# Patient Record
Sex: FEMALE | Race: WHITE | NOT HISPANIC OR LATINO | Employment: UNEMPLOYED | URBAN - METROPOLITAN AREA
[De-identification: names, ages, dates, MRNs, and addresses within clinical notes are randomized per-mention and may not be internally consistent; named-entity substitution may affect disease eponyms.]

---

## 2024-03-26 ENCOUNTER — TELEPHONE (OUTPATIENT)
Dept: INTERNAL MEDICINE | Facility: CLINIC | Age: 19
End: 2024-03-26
Payer: COMMERCIAL

## 2024-03-26 NOTE — TELEPHONE ENCOUNTER
Caller: Jordan Schilling    Relationship: Self    Best call back number: [phone]    What is the best time to reach you: ANYTIME     What was the call regarding: PATIENT STATES HER MOM BETITO LYON(STORY) IS A PATIENT OF [facility] AND WAS TOLD TO ASK FOR TANISHA TO GET HER WORKED IN WITH [facility] AS A NEW PATIENT.     PATIENT STATES SHE IS REQUESTING A CALL BACK.

## 2024-04-16 ENCOUNTER — OFFICE VISIT (OUTPATIENT)
Dept: INTERNAL MEDICINE | Facility: CLINIC | Age: 19
End: 2024-04-16
Payer: COMMERCIAL

## 2024-04-16 VITALS
BODY MASS INDEX: 20.11 KG/M2 | DIASTOLIC BLOOD PRESSURE: 70 MMHG | SYSTOLIC BLOOD PRESSURE: 112 MMHG | HEIGHT: 64 IN | HEART RATE: 71 BPM | WEIGHT: 117.8 LBS | OXYGEN SATURATION: 98 %

## 2024-04-16 DIAGNOSIS — L70.9 ADULT ACNE: ICD-10-CM

## 2024-04-16 DIAGNOSIS — J45.990 EXERCISE-INDUCED ASTHMA: ICD-10-CM

## 2024-04-16 DIAGNOSIS — Z00.00 PE (PHYSICAL EXAM), ANNUAL: Primary | ICD-10-CM

## 2024-04-16 DIAGNOSIS — Z11.59 SCREENING FOR VIRAL DISEASE: ICD-10-CM

## 2024-04-16 PROCEDURE — 99385 PREV VISIT NEW AGE 18-39: CPT | Performed by: NURSE PRACTITIONER

## 2024-04-16 RX ORDER — ALBUTEROL SULFATE 90 UG/1
2 AEROSOL, METERED RESPIRATORY (INHALATION) EVERY 4 HOURS PRN
Qty: 6.7 G | Refills: 0 | Status: SHIPPED | OUTPATIENT
Start: 2024-04-16

## 2024-04-16 RX ORDER — DROSPIRENONE AND ETHINYL ESTRADIOL 0.02-3(28)
KIT ORAL
COMMUNITY
Start: 2024-03-21

## 2024-04-16 RX ORDER — SPIRONOLACTONE 100 MG/1
TABLET, FILM COATED ORAL
COMMUNITY
Start: 2024-03-22

## 2024-04-17 LAB
ALBUMIN SERPL-MCNC: 4.2 G/DL (ref 3.5–5.2)
ALBUMIN/GLOB SERPL: 2.1 G/DL
ALP SERPL-CCNC: 64 U/L (ref 39–117)
ALT SERPL-CCNC: 13 U/L (ref 1–33)
AST SERPL-CCNC: 16 U/L (ref 1–32)
BILIRUB SERPL-MCNC: 0.2 MG/DL (ref 0–1.2)
BUN SERPL-MCNC: 12 MG/DL (ref 6–20)
BUN/CREAT SERPL: 14.5 (ref 7–25)
CALCIUM SERPL-MCNC: 9.3 MG/DL (ref 8.6–10.5)
CHLORIDE SERPL-SCNC: 103 MMOL/L (ref 98–107)
CHOLEST SERPL-MCNC: 148 MG/DL (ref 0–200)
CO2 SERPL-SCNC: 24.7 MMOL/L (ref 22–29)
CREAT SERPL-MCNC: 0.83 MG/DL (ref 0.57–1)
EGFRCR SERPLBLD CKD-EPI 2021: 103.6 ML/MIN/1.73
ERYTHROCYTE [DISTWIDTH] IN BLOOD BY AUTOMATED COUNT: 11.9 % (ref 12.3–15.4)
GLOBULIN SER CALC-MCNC: 2 GM/DL
GLUCOSE SERPL-MCNC: 81 MG/DL (ref 65–99)
HCT VFR BLD AUTO: 39.3 % (ref 34–46.6)
HCV IGG SERPL QL IA: NON REACTIVE
HDLC SERPL-MCNC: 84 MG/DL (ref 40–60)
HGB BLD-MCNC: 12.8 G/DL (ref 12–15.9)
LDLC SERPL CALC-MCNC: 50 MG/DL (ref 0–100)
MCH RBC QN AUTO: 27.6 PG (ref 26.6–33)
MCHC RBC AUTO-ENTMCNC: 32.6 G/DL (ref 31.5–35.7)
MCV RBC AUTO: 84.9 FL (ref 79–97)
PLATELET # BLD AUTO: 303 10*3/MM3 (ref 140–450)
POTASSIUM SERPL-SCNC: 4.4 MMOL/L (ref 3.5–5.2)
PROT SERPL-MCNC: 6.2 G/DL (ref 6–8.5)
RBC # BLD AUTO: 4.63 10*6/MM3 (ref 3.77–5.28)
SODIUM SERPL-SCNC: 137 MMOL/L (ref 136–145)
TRIGL SERPL-MCNC: 74 MG/DL (ref 0–150)
TSH SERPL DL<=0.005 MIU/L-ACNC: 2.09 UIU/ML (ref 0.27–4.2)
VLDLC SERPL CALC-MCNC: 14 MG/DL (ref 5–40)
WBC # BLD AUTO: 5.12 10*3/MM3 (ref 3.4–10.8)

## 2024-04-29 PROBLEM — J45.990 EXERCISE-INDUCED ASTHMA: Status: ACTIVE | Noted: 2024-04-29

## 2024-04-29 PROBLEM — L70.9 ADULT ACNE: Chronic | Status: ACTIVE | Noted: 2024-04-29

## 2024-04-29 PROBLEM — J45.990 EXERCISE-INDUCED ASTHMA: Chronic | Status: ACTIVE | Noted: 2024-04-29

## 2024-04-29 PROBLEM — L70.9 ADULT ACNE: Status: ACTIVE | Noted: 2024-04-29

## 2024-04-29 NOTE — PROGRESS NOTES
Christin Schilling is a 19 y.o. female who is here for a physical exam.    History of Present Illness   History of Present Illness  The patient presents to the office to establish care.     The patient maintains an active lifestyle, participating in track at [employer], and is currently in the season. She engages in weightlifting exercises twice a week and practices from 4:00 PM to 6:30 PM daily.    The patient was recently prescribed spironolactone 100 mg by her dermatologist for acne management. However, due to concerns about potential side effects, she has not yet started the medication. She has been on oral contraceptives since her xiomy year of high school, prescribed by her dermatologist. Her menstrual cycles are regular, and she denies any gastrointestinal symptoms such as diarrhea, constipation, hematochezia, or abdominal pain. She reports feeling emotional the week prior to her menstrual cycle. She has been applying topical clindamycin for the past 3 weeks for mgmt of acne, which has provided some relief.    The patient was diagnosed with exercise-induced asthma previously. She exhausted her inhaler supply a month ago and has not been using it since then. She typically uses her inhaler 30 minutes prior to running.    Supplemental Information  She has seasonal allergies, which normally worsen in the fall.     She gets migraines, with the last one being in 01/2024. She normally gets 2 migraines a year. She takes Excedrin when she gets a migraine, and it takes a little amount of time to work. She normally gets migraines when she is dehydrated.    She denies any neck or back issues. She wears sunscreen in the summer. She denies any knee or hip issues. She denies any joint issues or injuries. She denies any bad sprains with her ankles. She sleeps well at night.      History reviewed. No pertinent past medical history.      Current Outpatient Medications:     Azalea 3-0.02 MG per tablet, , Disp: , Rfl:      albuterol sulfate  (90 Base) MCG/ACT inhaler, Inhale 2 puffs Every 4 (Four) Hours As Needed for Wheezing., Disp: 6.7 g, Rfl: 0    spironolactone (ALDACTONE) 100 MG tablet, , Disp: , Rfl:     No Known Allergies    Review of Systems   Constitutional:  Negative for activity change, appetite change, chills, diaphoresis, fatigue, fever and unexpected weight change.   HENT:  Positive for congestion, postnasal drip and rhinorrhea. Negative for dental problem, drooling, ear discharge, ear pain, facial swelling, hearing loss, mouth sores, nosebleeds, sinus pressure, sore throat, tinnitus and trouble swallowing.    Eyes:  Negative for photophobia, pain, discharge, redness, itching and visual disturbance.   Respiratory:  Negative for apnea, cough, choking, chest tightness, shortness of breath and wheezing.    Cardiovascular:  Negative for chest pain, palpitations and leg swelling.        No orthopnea, PND, STOREY   Gastrointestinal:  Negative for abdominal pain, blood in stool, constipation, diarrhea, nausea and vomiting.   Endocrine: Negative for cold intolerance, heat intolerance, polydipsia and polyuria.   Genitourinary:  Negative for decreased urine volume, dysuria, enuresis, flank pain, frequency, hematuria and urgency.   Musculoskeletal:  Negative for arthralgias, back pain, gait problem, joint swelling, myalgias, neck pain and neck stiffness.   Skin:  Negative for color change and rash.        No hair changes, no nail changes   Allergic/Immunologic: Negative for environmental allergies, food allergies and immunocompromised state.   Neurological:  Negative for dizziness, tremors, seizures, syncope, speech difficulty, weakness, light-headedness, numbness and headaches.   Hematological:  Negative for adenopathy. Does not bruise/bleed easily.   Psychiatric/Behavioral:  Negative for agitation, confusion, decreased concentration, dysphoric mood, sleep disturbance and suicidal ideas. The patient is not nervous/anxious.   "      Objective   Vitals:    04/16/24 1119   BP: 112/70   BP Location: Left arm   Patient Position: Sitting   Cuff Size: Adult   Pulse: 71   SpO2: 98%   Weight: 53.4 kg (117 lb 12.8 oz)   Height: 161.3 cm (63.5\")     Physical Exam  Constitutional:       General: She is not in acute distress.     Appearance: Normal appearance. She is not diaphoretic.   HENT:      Head: Normocephalic and atraumatic.      Right Ear: Tympanic membrane, ear canal and external ear normal.      Left Ear: Tympanic membrane, ear canal and external ear normal.      Nose: Nose normal. No rhinorrhea.      Mouth/Throat:      Mouth: Mucous membranes are moist.      Pharynx: Oropharynx is clear. Posterior oropharyngeal erythema present.   Eyes:      General:         Right eye: No discharge.         Left eye: No discharge.      Conjunctiva/sclera: Conjunctivae normal.   Cardiovascular:      Rate and Rhythm: Normal rate and regular rhythm.      Pulses: Normal pulses.      Heart sounds: Normal heart sounds.   Pulmonary:      Effort: Pulmonary effort is normal.      Breath sounds: Normal breath sounds.   Abdominal:      General: Bowel sounds are normal.      Tenderness: There is no abdominal tenderness.   Musculoskeletal:         General: No swelling or tenderness.      Cervical back: Normal range of motion.   Skin:     General: Skin is warm and dry.      Comments: Scattered pustules in T zone   Neurological:      General: No focal deficit present.      Mental Status: She is alert and oriented to person, place, and time.   Psychiatric:         Mood and Affect: Mood normal.         Behavior: Behavior normal.         Judgment: Judgment normal.       Physical Exam  Vital Signs  The patient's blood pressure is 112/70.    Results         Assessment & Plan   Diagnoses and all orders for this visit:    1. PE (physical exam), annual (Primary)  -     CBC (No Diff)  -     Comprehensive Metabolic Panel  -     Lipid Panel  -     TSH    2. Exercise-induced " asthma  Assessment & Plan:  She may continue Albuterol inh as needed for bronchospasm and tightness, continue to monitor.    Orders:  -     albuterol sulfate  (90 Base) MCG/ACT inhaler; Inhale 2 puffs Every 4 (Four) Hours As Needed for Wheezing.  Dispense: 6.7 g; Refill: 0    3. Adult acne  Assessment & Plan:  She has seen derm regarding mgmt of acne, requests referral for a 2nd opinion which is placed. She will continue topical Clindamycin but I am hesitant for her to begin Spironolactone due to intense training with track season. If she does begin, advised to started with 1/2 tablet daily and monitor for side effects.      4. Screening for viral disease  -     Hepatitis C Antibody      Assessment & Plan      Risk Assessment:  Family History   Problem Relation Age of Onset    Myocarditis Mother     Hypertension Father     Namrata Parkinson White syndrome Maternal Grandfather     Heart attack Paternal Grandmother     Cancer Paternal Grandfather      Her Body mass index is 20.54 kg/m²..      Prevention:  Health Maintenance   Topic Date Due    ANNUAL PHYSICAL  Never done    INFLUENZA VACCINE  08/01/2024    TDAP/TD VACCINES (3 - Td or Tdap) 04/27/2030    HEPATITIS C SCREENING  Completed    COVID-19 Vaccine  Completed    Pneumococcal Vaccine 0-64  Completed    MENINGOCOCCAL VACCINE  Completed    HPV VACCINES  Completed       Discussed healthy lifestyle choices such as maintaining a balanced diet low in carbohydrates and limiting caffeine and alcohol intake.  Recommended routine exercise for bone strength and cardiovascular health.         Patient or patient representative verbalized consent for the use of Ambient Listening during the visit with  EDER Blanchard for chart documentation. 4/29/2024  18:59 EDT

## 2024-04-29 NOTE — ASSESSMENT & PLAN NOTE
She has seen derm regarding mgmt of acne, requests referral for a 2nd opinion which is placed. She will continue topical Clindamycin but I am hesitant for her to begin Spironolactone due to intense training with track season. If she does begin, advised to started with 1/2 tablet daily and monitor for side effects.  
She may continue Albuterol inh as needed for bronchospasm and tightness, continue to monitor.  
no

## 2025-04-14 DIAGNOSIS — J45.990 EXERCISE-INDUCED ASTHMA: ICD-10-CM

## 2025-04-21 ENCOUNTER — OFFICE VISIT (OUTPATIENT)
Dept: INTERNAL MEDICINE | Facility: CLINIC | Age: 20
End: 2025-04-21
Payer: COMMERCIAL

## 2025-04-21 VITALS
TEMPERATURE: 97.9 F | WEIGHT: 116.4 LBS | SYSTOLIC BLOOD PRESSURE: 114 MMHG | DIASTOLIC BLOOD PRESSURE: 78 MMHG | HEART RATE: 54 BPM | BODY MASS INDEX: 19.87 KG/M2 | OXYGEN SATURATION: 98 % | HEIGHT: 64 IN

## 2025-04-21 DIAGNOSIS — J45.990 EXERCISE-INDUCED ASTHMA: ICD-10-CM

## 2025-04-21 DIAGNOSIS — Z00.00 PHYSICAL EXAM: Primary | ICD-10-CM

## 2025-04-21 PROCEDURE — 99395 PREV VISIT EST AGE 18-39: CPT | Performed by: NURSE PRACTITIONER

## 2025-04-21 RX ORDER — ALBUTEROL SULFATE 90 UG/1
2 INHALANT RESPIRATORY (INHALATION) EVERY 4 HOURS PRN
Qty: 8.5 G | OUTPATIENT
Start: 2025-04-21

## 2025-04-21 RX ORDER — ALBUTEROL SULFATE 90 UG/1
2 INHALANT RESPIRATORY (INHALATION) EVERY 4 HOURS PRN
Qty: 6.7 G | Refills: 2 | Status: SHIPPED | OUTPATIENT
Start: 2025-04-21

## 2025-04-23 DIAGNOSIS — Z00.00 PHYSICAL EXAM: Primary | ICD-10-CM

## 2025-05-03 NOTE — PROGRESS NOTES
Christin Schilling is a 20 y.o. female who is here for a physical exam.    History of Present Illness   History of Present Illness  The patient presents for evaluation of exercise-induced asthma, migraines, and GERD.    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  Albuterol is used daily prior to physical exercise as a preventive measure against exercise-induced asthma. No current allergy symptoms are reported, and adequate hydration is maintained.    A history of migraines is noted, occurring once or twice annually, with no episodes experienced this year. Migraine management includes the use of Excedrin and consumption of Diet Coke.    No current heartburn or indigestion is reported. However, an incident in 02/2025 during a practice session involved chest burning and breathlessness, initially suspected to be an asthma attack. Consultation with her  suggested these symptoms were likely due to acid reflux, possibly triggered by late eating before practice. No similar episodes have occurred since then.    She has discontinued the use of spironolactone, prescribed by her dermatologist for acne treatment, due to its side effect of drowsiness. Acne condition has significantly improved over the past year.     She is under the care of a gynecologist at [facility], with her initial appointment having taken place in 11/2024 and a subsequent appointment scheduled in the coming months.    Right foot pain experienced in 10/2024 was evaluated by Dr. Elder Rodriguez. An MRI scan revealed inflammation in the third toe bone of the right foot, attributed to impact. She rested the area for aniya 1 week after which activities were resumed with the aid of a foot pad for 2 to 3 weeks until symptoms resolved. No recurrence of this issue has been reported. Muscle-related back pain is attributed to running.      History reviewed. No pertinent past medical history.      Current Outpatient  Medications:     albuterol sulfate  (90 Base) MCG/ACT inhaler, Inhale 2 puffs Every 4 (Four) Hours As Needed for Wheezing., Disp: 6.7 g, Rfl: 2    Azalea 3-0.02 MG per tablet, , Disp: , Rfl:     No Known Allergies    Review of Systems   Constitutional:  Negative for activity change, appetite change, chills, diaphoresis, fatigue, fever and unexpected weight change.   HENT:  Positive for congestion and postnasal drip. Negative for dental problem, drooling, ear discharge, ear pain, facial swelling, hearing loss, mouth sores, nosebleeds, rhinorrhea, sinus pressure, sore throat, tinnitus and trouble swallowing.    Eyes:  Negative for photophobia, pain, discharge, redness, itching and visual disturbance.   Respiratory:  Positive for cough. Negative for apnea, choking, chest tightness, shortness of breath and wheezing.    Cardiovascular:  Negative for chest pain, palpitations and leg swelling.        No orthopnea, PND, STOREY   Gastrointestinal:  Negative for abdominal pain, blood in stool, constipation, diarrhea, nausea and vomiting.   Endocrine: Negative for cold intolerance, heat intolerance, polydipsia and polyuria.   Genitourinary:  Negative for decreased urine volume, dysuria, enuresis, flank pain, frequency, hematuria and urgency.   Musculoskeletal:  Negative for arthralgias, back pain, gait problem, joint swelling, myalgias, neck pain and neck stiffness.   Skin:  Negative for color change and rash.        No hair changes, no nail changes   Allergic/Immunologic: Negative for environmental allergies, food allergies and immunocompromised state.   Neurological:  Positive for headaches. Negative for dizziness, tremors, seizures, syncope, speech difficulty, weakness, light-headedness and numbness.   Hematological:  Negative for adenopathy. Does not bruise/bleed easily.   Psychiatric/Behavioral:  Negative for agitation, confusion, decreased concentration, dysphoric mood, sleep disturbance and suicidal ideas. The  "patient is not nervous/anxious.        Objective   Vitals:    04/21/25 1409   BP: 114/78   BP Location: Left arm   Patient Position: Sitting   Cuff Size: Adult   Pulse: 54   Temp: 97.9 °F (36.6 °C)   SpO2: 98%   Weight: 52.8 kg (116 lb 6.4 oz)   Height: 161.3 cm (63.5\")     Physical Exam  Constitutional:       General: She is not in acute distress.     Appearance: Normal appearance. She is not diaphoretic.   HENT:      Head: Normocephalic and atraumatic.      Right Ear: Tympanic membrane, ear canal and external ear normal.      Left Ear: Tympanic membrane, ear canal and external ear normal.      Nose: Nose normal. No rhinorrhea.      Mouth/Throat:      Mouth: Mucous membranes are moist.      Pharynx: Oropharynx is clear.   Eyes:      General:         Right eye: No discharge.         Left eye: No discharge.      Conjunctiva/sclera: Conjunctivae normal.   Cardiovascular:      Rate and Rhythm: Normal rate and regular rhythm.      Pulses: Normal pulses.      Heart sounds: Normal heart sounds.   Pulmonary:      Effort: Pulmonary effort is normal.      Breath sounds: Normal breath sounds.   Abdominal:      General: Bowel sounds are normal.      Tenderness: There is no abdominal tenderness.   Musculoskeletal:         General: No swelling or tenderness.      Cervical back: Normal range of motion.   Skin:     General: Skin is warm and dry.   Neurological:      General: No focal deficit present.      Mental Status: She is alert and oriented to person, place, and time.   Psychiatric:         Mood and Affect: Mood normal.         Behavior: Behavior normal.         Judgment: Judgment normal.           Results  Imaging   - MRI of the right foot: 10/2024, Inflammation in the third toe bone       Assessment & Plan   Diagnoses and all orders for this visit:    1. Physical exam (Primary)  -     Cancel: CBC (No Diff)  -     Cancel: Comprehensive Metabolic Panel  -     Cancel: Lipid Panel  -     Cancel: TSH    2. Exercise-induced " asthma  Assessment & Plan:  - Uses albuterol daily before practice to manage symptoms.  - Prescription for albuterol will be sent to the pharmacy.  - Advised to ensure she never runs out of this medication and to contact the office immediately if there are any issues with refills.    Orders:  -     albuterol sulfate  (90 Base) MCG/ACT inhaler; Inhale 2 puffs Every 4 (Four) Hours As Needed for Wheezing.  Dispense: 6.7 g; Refill: 2      Assessment & Plan    Health maintenance.  - Fasting labs have been ordered.  - Will schedule labs at her convenience.  - No issues with stress or sleep reported.  - No additional treatment or referrals required.    Risk Assessment:  Family History   Problem Relation Age of Onset    Myocarditis Mother     Hypertension Father     Namrata Parkinson White syndrome Maternal Grandfather     Heart attack Paternal Grandmother     Cancer Paternal Grandfather      Her Body mass index is 20.3 kg/m².She remains active and tries to follow a low-fat, low-cholesterol diet.    Prevention:  Health Maintenance   Topic Date Due    Pneumococcal Vaccine 0-49 (1 of 1 - PPSV23) 02/27/2010    MENINGOCOCCAL B VACCINE (1 of 2 - Standard) 12/01/2020    COVID-19 Vaccine (5 - 2024-25 season) 09/01/2024    ANNUAL PHYSICAL  04/16/2025    INFLUENZA VACCINE  07/01/2025    PAP SMEAR  11/01/2027    TDAP/TD VACCINES (3 - Td or Tdap) 04/27/2030    HEPATITIS C SCREENING  Completed    MENINGOCOCCAL VACCINE  Completed    HPV VACCINES  Completed       Discussed healthy lifestyle choices such as maintaining a balanced diet low in carbohydrates and limiting caffeine and alcohol intake.  Recommended routine exercise for bone strength and cardiovascular health.         Patient or patient representative verbalized consent for the use of Ambient Listening during the visit with  EDER Blanchard for chart documentation. 5/4/2025  12:30 EDT

## 2025-05-04 NOTE — ASSESSMENT & PLAN NOTE
- Uses albuterol daily before practice to manage symptoms.  - Prescription for albuterol will be sent to the pharmacy.  - Advised to ensure she never runs out of this medication and to contact the office immediately if there are any issues with refills.

## 2025-08-27 ENCOUNTER — TELEPHONE (OUTPATIENT)
Dept: INTERNAL MEDICINE | Facility: CLINIC | Age: 20
End: 2025-08-27
Payer: COMMERCIAL

## 2025-08-27 ENCOUNTER — NURSE TRIAGE (OUTPATIENT)
Dept: CALL CENTER | Facility: HOSPITAL | Age: 20
End: 2025-08-27
Payer: COMMERCIAL

## 2025-08-29 ENCOUNTER — OFFICE VISIT (OUTPATIENT)
Dept: INTERNAL MEDICINE | Facility: CLINIC | Age: 20
End: 2025-08-29
Payer: COMMERCIAL

## 2025-08-29 VITALS
WEIGHT: 122.4 LBS | OXYGEN SATURATION: 100 % | BODY MASS INDEX: 21.69 KG/M2 | HEART RATE: 70 BPM | DIASTOLIC BLOOD PRESSURE: 72 MMHG | SYSTOLIC BLOOD PRESSURE: 110 MMHG | HEIGHT: 63 IN

## 2025-08-29 DIAGNOSIS — G43.009 MIGRAINE WITHOUT AURA AND WITHOUT STATUS MIGRAINOSUS, NOT INTRACTABLE: Primary | ICD-10-CM

## 2025-08-29 DIAGNOSIS — J45.990 EXERCISE-INDUCED ASTHMA: Chronic | ICD-10-CM

## 2025-08-29 LAB
BUN SERPL-MCNC: 13 MG/DL (ref 6–20)
BUN/CREAT SERPL: 17.8 (ref 7–25)
CALCIUM SERPL-MCNC: 9.6 MG/DL (ref 8.6–10.5)
CHLORIDE SERPL-SCNC: 104 MMOL/L (ref 98–107)
CO2 SERPL-SCNC: 23.1 MMOL/L (ref 22–29)
CREAT SERPL-MCNC: 0.73 MG/DL (ref 0.57–1)
EGFRCR SERPLBLD CKD-EPI 2021: 120.2 ML/MIN/1.73
ERYTHROCYTE [DISTWIDTH] IN BLOOD BY AUTOMATED COUNT: 11.9 % (ref 12.3–15.4)
ERYTHROCYTE [SEDIMENTATION RATE] IN BLOOD BY WESTERGREN METHOD: <1 MM/HR (ref 0–20)
GLUCOSE SERPL-MCNC: 91 MG/DL (ref 65–99)
HCT VFR BLD AUTO: 39.5 % (ref 34–46.6)
HGB BLD-MCNC: 12.9 G/DL (ref 12–15.9)
MCH RBC QN AUTO: 29.5 PG (ref 26.6–33)
MCHC RBC AUTO-ENTMCNC: 32.7 G/DL (ref 31.5–35.7)
MCV RBC AUTO: 90.2 FL (ref 79–97)
PLATELET # BLD AUTO: 261 10*3/MM3 (ref 140–450)
POTASSIUM SERPL-SCNC: 4.6 MMOL/L (ref 3.5–5.2)
RBC # BLD AUTO: 4.38 10*6/MM3 (ref 3.77–5.28)
SODIUM SERPL-SCNC: 139 MMOL/L (ref 136–145)
WBC # BLD AUTO: 6.74 10*3/MM3 (ref 3.4–10.8)

## 2025-08-29 PROCEDURE — 99214 OFFICE O/P EST MOD 30 MIN: CPT | Performed by: NURSE PRACTITIONER

## 2025-08-29 RX ORDER — RIZATRIPTAN BENZOATE 10 MG/1
10 TABLET ORAL ONCE AS NEEDED
Qty: 9 TABLET | Refills: 1 | Status: SHIPPED | OUTPATIENT
Start: 2025-08-29

## 2025-08-30 PROBLEM — G43.009 MIGRAINE WITHOUT AURA AND WITHOUT STATUS MIGRAINOSUS, NOT INTRACTABLE: Status: ACTIVE | Noted: 2025-08-30

## 2025-08-30 PROBLEM — G43.009 MIGRAINE WITHOUT AURA AND WITHOUT STATUS MIGRAINOSUS, NOT INTRACTABLE: Chronic | Status: ACTIVE | Noted: 2025-08-30
